# Patient Record
Sex: MALE | Race: OTHER
[De-identification: names, ages, dates, MRNs, and addresses within clinical notes are randomized per-mention and may not be internally consistent; named-entity substitution may affect disease eponyms.]

---

## 2019-08-06 ENCOUNTER — HOSPITAL ENCOUNTER (EMERGENCY)
Dept: HOSPITAL 62 - ER | Age: 39
Discharge: HOME | End: 2019-08-06
Payer: COMMERCIAL

## 2019-08-06 VITALS — SYSTOLIC BLOOD PRESSURE: 135 MMHG | DIASTOLIC BLOOD PRESSURE: 90 MMHG

## 2019-08-06 DIAGNOSIS — B02.9: Primary | ICD-10-CM

## 2019-08-06 PROCEDURE — 99282 EMERGENCY DEPT VISIT SF MDM: CPT

## 2019-08-06 NOTE — ER DOCUMENT REPORT
HPI





- HPI


Time Seen by Provider: 08/06/19 11:00


Pain Level: 5


Notes: 





Patient is a 38-year-old male with no significant past medical history aside 

from chickenpox as a child who presents complaining of rash to the right upper 

chest/shoulder area that is been present for the past 2 to 3 days.  Patient 

states that the rash is painful, sore, and has occasional itching.  He was told 

by another provider that it was shingles, but was not placed on any medications.

 Patient is here for second opinion as well.  Denies drug allergies.  No other 

concerns or complaints.  He has not been exposed any new chemicals, detergents, 

soaps, plants, foods.  Denies any headache, fever, neck pain, changes in 

vision/speech/mentation/hearing, URI, sore throat, chest pain, palpitations, 

syncope, cough, shortness of breath, wheeze, dyspnea, abdominal pain, 

nausea/vomiting/diarrhea, urinary retention, dysuria, hematuria.





- ROS


Systems Reviewed and Negative: Yes All other systems reviewed and negative





Past Medical History





- Social History


Smoking Status: Unknown if Ever Smoked


Family History: Reviewed & Not Pertinent





Vertical Provider Document





- CONSTITUTIONAL


Agree With Documented VS: Yes


Notes: 





PHYSICAL EXAMINATION:





GENERAL: Well-appearing, well-nourished and in no acute distress.





HEAD: Atraumatic, normocephalic.





EYES: Pupils equal round and reactive to light, extraocular movements intact, 

sclera anicteric, conjunctiva are normal.





ENT: EAC clear b/l.  TM's intact b/l without erythema, fluid, or perforation.  

Nares patent and without discharge.  oropharynx clear without exudates.  No 

tonsilar hypertrophy or erythema.  Moist mucous membranes.  No sinus tenderness.





NECK: Normal range of motion, supple without lymphadenopathy





LUNGS: Breath sounds clear to auscultation bilaterally and equal.  No wheezes 

rales or rhonchi.





HEART: Regular rate and rhythm without murmurs, rubs, gallops.





Musculoskeletal: FROM to passive/active. Strength 5+/5. 





Extremities:  No cyanosis, clubbing, or edema b/l.  Peripheral pulses 2+.  

Capillary refill less than 3 seconds.





NEUROLOGICAL: Cranial nerves grossly intact.  Normal speech, normal gait.  

Normal sensory, motor exams 





PSYCH: Normal mood, normal affect.





SKIN: + grouped vesicular lesions noted with erythema and tenderness along 

dermatome C4 consistent with shingles.





- INFECTION CONTROL


TRAVEL OUTSIDE OF THE [place] IN LAST 30 DAYS: No





Course





- Re-evaluation


Re-evalutation: 





08/06/19 11:07


Patient is an afebrile, well-hydrated, 38-year-old male who presents with 

shingles to the C4 dermatome approximately.  Vitals are acceptable without 

significant tachycardia, tachypnea, or hypoxia.  PE is otherwise unremarkable.  

Patient is nontoxic-appearing is tolerating p.o. without difficulty.  No further

work-up warranted.  No eye or ear involvement.  Low suspicion for any 

necrotizing fasciitis, SJS, SSS, drug reaction, sepsis, meningitis, syphilis, 

Lyme disease, [place] spotted fever, or other systemic emergent condition at

this time.  Patient aware that condition can change from initial presentation 

and he needs to monitor symptoms closely and seek medical attention with any 

acute changes.  Recheck with your PCM in 3-5 days.  Consider consult with 

dermatology.  Return to the ED with any other worsening/concerning symptoms as 

reviewed.  Patient is in agreement.





- Vital Signs


Vital signs: 


                                        











Temp Pulse Resp BP Pulse Ox


 


 98.1 F   89   20   135/90 H  98 


 


 08/06/19 10:40  08/06/19 10:40  08/06/19 10:40  08/06/19 10:40  08/06/19 10:40














Discharge





- Discharge


Clinical Impression: 


Shingles


Qualifiers:


 Herpes zoster complications: without complications Qualified Code(s): B02.9 - 

Zoster without complications





Condition: Stable


Disposition: HOME, SELF-CARE


Instructions:  Shingles ([facility])


Additional Instructions: 


Keep the skin clean


Wash with soap and water


Tylenol/ibuprofen if needed


Triple antibiotic ointment daily for any break in the skin


Take medication as directed


Monitor for any worsening symptoms


Recheck with your PCM in 3-5 days


Return to the ED with any worsening symptoms and/or development of fever, 

headache, chest pain, palpitations, syncope, shortness of breath, trouble 

breathing, abdominal pain, n/v/d, abscess, purulent discharge, red streaks, 

worsening swelling, or other worsening symptoms that are concerning to you.


Prescriptions: 


Tramadol HCl [Ultram 50 mg Tablet] 50 mg PO Q4HP PRN #12 tab


 PRN Reason: 


Lidocaine 15 gm TP TID PRN #15 cream..g.


 PRN Reason: 


Valacyclovir HCl [Valtrex] 1,000 mg PO TID #21 tablet


Forms:  Elevated Blood Pressure


Referrals: 


MABEL ELLER DO [ACTIVE STAFF] - Follow up as needed